# Patient Record
Sex: MALE | Race: WHITE | NOT HISPANIC OR LATINO | URBAN - METROPOLITAN AREA
[De-identification: names, ages, dates, MRNs, and addresses within clinical notes are randomized per-mention and may not be internally consistent; named-entity substitution may affect disease eponyms.]

---

## 2021-10-27 ENCOUNTER — NEW PATIENT ROUTINE/VISION (OUTPATIENT)
Dept: URBAN - METROPOLITAN AREA CLINIC 52 | Facility: CLINIC | Age: 71
End: 2021-10-27

## 2021-10-27 DIAGNOSIS — H52.4: ICD-10-CM

## 2021-10-27 DIAGNOSIS — Z01.01: ICD-10-CM

## 2021-10-27 DIAGNOSIS — H33.011: ICD-10-CM

## 2021-10-27 PROCEDURE — 92015 DETERMINE REFRACTIVE STATE: CPT

## 2021-10-27 PROCEDURE — 92134 CPTRZ OPH DX IMG PST SGM RTA: CPT

## 2021-10-27 PROCEDURE — 92004 COMPRE OPH EXAM NEW PT 1/>: CPT

## 2021-10-27 ASSESSMENT — KERATOMETRY
OD_AXISANGLE2_DEGREES: 094
OD_K1POWER_DIOPTERS: 41.75
OD_K2POWER_DIOPTERS: 42.50
OS_AXISANGLE2_DEGREES: 57
OS_AXISANGLE_DEGREES: 147
OS_K2POWER_DIOPTERS: 42.25
OS_K1POWER_DIOPTERS: 42.00
OD_AXISANGLE_DEGREES: 4

## 2021-10-27 ASSESSMENT — VISUAL ACUITY
OD_GLARE: 20/30
OD_SC: 20/40+2
OS_SC: 20/25+2
OS_GLARE: 20/25
OD_PH: 20/25-2
OS_GLARE: 20/25
OD_GLARE: 20/40
OU_SC: J4

## 2021-10-27 ASSESSMENT — TONOMETRY
OS_IOP_MMHG: 16
OD_IOP_MMHG: 15

## 2021-10-27 NOTE — PATIENT DISCUSSION
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 Round break superior in periphery. Patient to go straight to [facility] for STAT referral. Will be seeing Dr. Felisha Olson. Will release care to [facility]. Will continue to see patient for annual examination.

## 2022-06-13 ENCOUNTER — COMPREHENSIVE EXAM (OUTPATIENT)
Dept: URBAN - METROPOLITAN AREA CLINIC 50 | Facility: CLINIC | Age: 72
End: 2022-06-13

## 2022-06-13 DIAGNOSIS — H21.541: ICD-10-CM

## 2022-06-13 DIAGNOSIS — H25.811: ICD-10-CM

## 2022-06-13 PROCEDURE — 92014 COMPRE OPH EXAM EST PT 1/>: CPT

## 2022-06-13 PROCEDURE — 92134 CPTRZ OPH DX IMG PST SGM RTA: CPT

## 2022-06-13 ASSESSMENT — VISUAL ACUITY
OD_SC: CF 2FT
OS_SC: 20/20

## 2022-06-13 ASSESSMENT — KERATOMETRY
OD_K1POWER_DIOPTERS: 41.75
OS_AXISANGLE_DEGREES: 147
OS_K2POWER_DIOPTERS: 42.25
OD_AXISANGLE_DEGREES: 4
OD_AXISANGLE2_DEGREES: 094
OD_K2POWER_DIOPTERS: 42.50
OS_AXISANGLE2_DEGREES: 57
OS_K1POWER_DIOPTERS: 42.00

## 2022-06-13 ASSESSMENT — TONOMETRY
OS_IOP_MMHG: 12
OD_IOP_MMHG: 13

## 2022-06-13 NOTE — PATIENT DISCUSSION
Will need Lysis of Adhesions at time of cataract surgery. Try Pan Blue and extra Duet will be needed.

## 2022-06-13 NOTE — PATIENT DISCUSSION
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 Round break superior in periphery. Patient to go straight to [facility] for STAT referral. Will be seeing Dr. Josiane Hercules. [facility] Will continue to see patient for annual examination.

## 2022-06-23 ENCOUNTER — DIAGNOSTICS ONLY (OUTPATIENT)
Dept: URBAN - METROPOLITAN AREA CLINIC 50 | Facility: CLINIC | Age: 72
End: 2022-06-23

## 2022-06-23 DIAGNOSIS — H25.811: ICD-10-CM

## 2022-06-23 PROCEDURE — 92136 OPHTHALMIC BIOMETRY: CPT

## 2022-06-23 PROCEDURE — 92025IOL CORNEAL TOPOGRAPHY PREMIUM IOL

## 2022-06-23 ASSESSMENT — KERATOMETRY
OD_K2POWER_DIOPTERS: 40.00
OD_AXISANGLE_DEGREES: 106
OD_K1POWER_DIOPTERS: 41.25
OS_K1POWER_DIOPTERS: 42.25
OS_K2POWER_DIOPTERS: 41.62
OS_AXISANGLE2_DEGREES: 130
OS_AXISANGLE_DEGREES: 40
OD_AXISANGLE2_DEGREES: 16

## 2022-06-23 NOTE — PATIENT DISCUSSION
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 Round break superior in periphery. Patient to go straight to [facility] for STAT referral. Will be seeing Dr. Katelynn Odonnell. [facility] Will continue to see patient for annual examination.

## 2022-07-18 ENCOUNTER — PRE-OP/H&P (OUTPATIENT)
Dept: URBAN - METROPOLITAN AREA CLINIC 50 | Facility: CLINIC | Age: 72
End: 2022-07-18

## 2022-07-18 DIAGNOSIS — H25.811: ICD-10-CM

## 2022-07-18 DIAGNOSIS — H43.812: ICD-10-CM

## 2022-07-18 PROCEDURE — 92134 CPTRZ OPH DX IMG PST SGM RTA: CPT

## 2022-07-18 PROCEDURE — PREOP PRE OP VISIT

## 2022-07-18 ASSESSMENT — TONOMETRY
OD_IOP_MMHG: 14
OS_IOP_MMHG: 15

## 2022-07-18 ASSESSMENT — KERATOMETRY
OD_K1POWER_DIOPTERS: 41.25
OD_K2POWER_DIOPTERS: 40.00
OS_K1POWER_DIOPTERS: 42.25
OS_AXISANGLE2_DEGREES: 130
OD_AXISANGLE_DEGREES: 106
OS_AXISANGLE_DEGREES: 40
OD_AXISANGLE2_DEGREES: 16
OS_K2POWER_DIOPTERS: 41.62

## 2022-07-18 ASSESSMENT — VISUAL ACUITY
OS_SC: 20/40
OS_PH: 20/30
OD_SC: CF 1FT

## 2022-07-18 NOTE — PATIENT DISCUSSION
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 Round break superior in periphery. Patient to go straight to [facility] for STAT referral. Will be seeing Dr. Jacqueline Ward. [facility] Will continue to see patient for annual examination.

## 2022-07-27 ASSESSMENT — KERATOMETRY
OS_K1POWER_DIOPTERS: 42.25
OS_AXISANGLE_DEGREES: 40
OD_AXISANGLE_DEGREES: 106
OD_AXISANGLE2_DEGREES: 16
OD_K1POWER_DIOPTERS: 41.25
OD_K2POWER_DIOPTERS: 40.00
OS_K2POWER_DIOPTERS: 41.62
OS_AXISANGLE2_DEGREES: 130

## 2022-07-28 ENCOUNTER — POST-OP (OUTPATIENT)
Dept: URBAN - METROPOLITAN AREA CLINIC 48 | Facility: CLINIC | Age: 72
End: 2022-07-28

## 2022-07-28 ENCOUNTER — SURGERY/PROCEDURE (OUTPATIENT)
Dept: URBAN - METROPOLITAN AREA SURGERY 16 | Facility: SURGERY | Age: 72
End: 2022-07-28

## 2022-07-28 DIAGNOSIS — H21.541: ICD-10-CM

## 2022-07-28 DIAGNOSIS — H25.811: ICD-10-CM

## 2022-07-28 DIAGNOSIS — H57.03: ICD-10-CM

## 2022-07-28 DIAGNOSIS — Z96.1: ICD-10-CM

## 2022-07-28 DIAGNOSIS — Z98.41: ICD-10-CM

## 2022-07-28 PROCEDURE — 66982 XCAPSL CTRC RMVL CPLX WO ECP: CPT

## 2022-07-28 ASSESSMENT — KERATOMETRY
OD_AXISANGLE_DEGREES: 106
OS_AXISANGLE2_DEGREES: 130
OD_K1POWER_DIOPTERS: 41.25
OS_K2POWER_DIOPTERS: 41.62
OD_AXISANGLE2_DEGREES: 16
OD_K2POWER_DIOPTERS: 40.00
OS_AXISANGLE_DEGREES: 40
OS_K1POWER_DIOPTERS: 42.25

## 2022-07-28 ASSESSMENT — VISUAL ACUITY: OD_SC: CF 2FT

## 2022-07-28 ASSESSMENT — TONOMETRY: OD_IOP_MMHG: 10

## 2022-07-28 NOTE — PATIENT DISCUSSION
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 Round break superior in periphery. Patient to go straight to [facility] for STAT referral. Will be seeing Dr. Felisha Olson. Will release care to [facility]. Will continue to see patient for annual examination.

## 2022-07-28 NOTE — PATIENT DISCUSSION
Will hold on final Rx until patient cleared from [facility].

## 2022-07-28 NOTE — PATIENT DISCUSSION
Good ocular health on dilated exam today despite abnormal findings. Eyeglass prescription given. Patient instructed to call office immediately if sudden changes in vision occur. Emphasized importance of sunglasses and healthy lifestyle. LM to call office to discuss Lomotil

## 2022-07-28 NOTE — PATIENT DISCUSSION
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 Round break superior in periphery. Patient to go straight to [facility] for STAT referral. Will be seeing Dr. Tram Henson. Will release care to [facility]. Will continue to see patient for annual examination.

## 2022-08-01 ENCOUNTER — POST-OP (OUTPATIENT)
Dept: URBAN - METROPOLITAN AREA CLINIC 50 | Facility: CLINIC | Age: 72
End: 2022-08-01

## 2022-08-01 DIAGNOSIS — Z98.41: ICD-10-CM

## 2022-08-01 PROCEDURE — 99024 POSTOP FOLLOW-UP VISIT: CPT

## 2022-08-01 RX ORDER — DUREZOL 0.5 MG/ML
1 EMULSION OPHTHALMIC TWICE A DAY
Start: 2022-08-01

## 2022-08-01 RX ORDER — SODIUM CHLORIDE 50 MG/G
OINTMENT OPHTHALMIC EVERY EVENING
Start: 2022-08-01

## 2022-08-01 ASSESSMENT — KERATOMETRY
OS_K2POWER_DIOPTERS: 41.62
OS_AXISANGLE2_DEGREES: 130
OD_K1POWER_DIOPTERS: 41.25
OS_AXISANGLE_DEGREES: 40
OD_AXISANGLE_DEGREES: 106
OS_K1POWER_DIOPTERS: 42.25
OD_AXISANGLE2_DEGREES: 16
OD_K2POWER_DIOPTERS: 40.00

## 2022-08-01 ASSESSMENT — VISUAL ACUITY: OD_SC: CF 3FT

## 2022-08-01 ASSESSMENT — TONOMETRY: OD_IOP_MMHG: 14

## 2022-08-01 NOTE — PATIENT DISCUSSION
Mature Cataract: During surgery, utilizing trypan blue, the anterior capsule immediately had a bi-directional rent ([place]). The Posterior capsule had leathery, dense capsular plaque. Able to remove the cataract and place the IOL without incident. Advised patient in today post op appointment that he will require a posterior capsulotomy (Possible surgical intervention) down the road.

## 2022-08-01 NOTE — PATIENT DISCUSSION
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 Will follow up as scheduled.

## 2022-08-01 NOTE — PATIENT DISCUSSION
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 Round break superior in periphery. Patient to go straight to [facility] for STAT referral. Will be seeing Dr. Kinjal Arriaga. Will release care to [facility]. Will continue to see patient for annual examination.

## 2022-08-29 ENCOUNTER — POST-OP (OUTPATIENT)
Dept: URBAN - METROPOLITAN AREA CLINIC 50 | Facility: CLINIC | Age: 72
End: 2022-08-29

## 2022-08-29 DIAGNOSIS — H26.491: ICD-10-CM

## 2022-08-29 DIAGNOSIS — Z96.1: ICD-10-CM

## 2022-08-29 DIAGNOSIS — H33.41: ICD-10-CM

## 2022-08-29 DIAGNOSIS — Z98.41: ICD-10-CM

## 2022-08-29 PROCEDURE — 99024 POSTOP FOLLOW-UP VISIT: CPT

## 2022-08-29 PROCEDURE — 66821 AFTER CATARACT LASER SURGERY: CPT

## 2022-08-29 PROCEDURE — 92134 CPTRZ OPH DX IMG PST SGM RTA: CPT

## 2022-08-29 RX ORDER — PREDNISOLONE ACETATE 10 MG/ML
1 SUSPENSION/ DROPS OPHTHALMIC
Start: 2022-08-29

## 2022-08-29 ASSESSMENT — KERATOMETRY
OD_AXISANGLE_DEGREES: 001
OD_K2POWER_DIOPTERS: 42.25
OD_AXISANGLE2_DEGREES: 91
OS_AXISANGLE_DEGREES: 137
OS_K2POWER_DIOPTERS: 42.25
OD_K1POWER_DIOPTERS: 41.00
OS_AXISANGLE2_DEGREES: 047
OS_K1POWER_DIOPTERS: 42.00

## 2022-08-29 ASSESSMENT — TONOMETRY
OS_IOP_MMHG: 13
OD_IOP_MMHG: 14

## 2022-08-29 ASSESSMENT — VISUAL ACUITY
OS_SC: J2@14"
OD_SC: CF 3FT
OU_SC: 20/30-1
OD_SC: 20/400@14"
OS_SC: 20/30-2
OU_SC: J2@14"
OS_PH: 20/25

## 2022-08-29 NOTE — PATIENT DISCUSSION
YAG Laser OD performed today with patient's signed consent. Will start patient on Durezol BID until seen by [facility] or [facility].

## 2022-08-29 NOTE — PROCEDURE NOTE: CLINICAL
PROCEDURE NOTE: YAG Capsulotomy OD. Diagnosis: Posterior Capsular Opacification (PCO). Prior to treatment, the risks/benefits/alternatives were discussed. The patient wished to proceed with procedure. Consent was signed. Proparacaine and brominidine were placed into the operative eye after the eye was dilated. Power = 2.5mJ. Number of pulses = 62. Patient tolerated procedure well and there were no complications. Post Laser instructions given. Rey Souza

## 2022-08-29 NOTE — PATIENT DISCUSSION
PCO ([id number]): Visually significant PCO present on exam today. Recommend YAG laser capsulotomy to improve vision and decrease glare symptoms. RBAs of procedure discussed. Patient agrees and wishes to proceed.

## 2022-08-29 NOTE — PATIENT DISCUSSION
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 Round break superior in periphery. Patient to go straight to [facility] for STAT referral. Will be seeing Dr. Cari Murry. [facility] Will continue to see patient for annual examination.

## 2022-09-19 ENCOUNTER — POST-OP (OUTPATIENT)
Dept: URBAN - METROPOLITAN AREA CLINIC 50 | Facility: CLINIC | Age: 72
End: 2022-09-19

## 2022-09-19 DIAGNOSIS — Z96.1: ICD-10-CM

## 2022-09-19 DIAGNOSIS — H18.231: ICD-10-CM

## 2022-09-19 DIAGNOSIS — H43.812: ICD-10-CM

## 2022-09-19 DIAGNOSIS — Z98.890: ICD-10-CM

## 2022-09-19 PROCEDURE — 99024 POSTOP FOLLOW-UP VISIT: CPT

## 2022-09-19 PROCEDURE — 92134 CPTRZ OPH DX IMG PST SGM RTA: CPT

## 2022-09-19 ASSESSMENT — TONOMETRY
OS_IOP_MMHG: 14
OD_IOP_MMHG: 13

## 2022-09-19 ASSESSMENT — KERATOMETRY
OD_K2POWER_DIOPTERS: 42.25
OD_AXISANGLE2_DEGREES: 91
OD_K1POWER_DIOPTERS: 41.00
OS_AXISANGLE2_DEGREES: 047
OS_K1POWER_DIOPTERS: 42.00
OD_AXISANGLE_DEGREES: 001
OS_AXISANGLE_DEGREES: 137
OS_K2POWER_DIOPTERS: 42.25

## 2022-09-19 ASSESSMENT — VISUAL ACUITY
OD_SC: 20/400
OS_SC: 20/20-2

## 2022-09-19 NOTE — PATIENT DISCUSSION
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 Round break superior in periphery. Patient to go straight to [facility] for STAT referral. Will be seeing Dr. Christine Santana. [facility] Will continue to see patient for annual examination.

## 2022-09-19 NOTE — PATIENT DISCUSSION
Will continue patient on Johann 128 5% Oint QHS OD and Durezol BID until instructed to discontinue. Will follow up in 5-6 weeks for DFE OD and Repeat MRX OU with MAC OCT.

## 2022-09-19 NOTE — PATIENT DISCUSSION
PCO ([id number]): Visually significant PCO present on exam today. Recommend YAG laser capsulotomy to improve vision and decrease glare symptoms. RBAs of procedure discussed. Patient agrees and wishes to proceed.

## 2022-10-17 ENCOUNTER — ESTABLISHED PATIENT (OUTPATIENT)
Dept: URBAN - METROPOLITAN AREA CLINIC 50 | Facility: CLINIC | Age: 72
End: 2022-10-17

## 2022-10-17 DIAGNOSIS — H33.41: ICD-10-CM

## 2022-10-17 DIAGNOSIS — H35.033: ICD-10-CM

## 2022-10-17 DIAGNOSIS — H18.231: ICD-10-CM

## 2022-10-17 DIAGNOSIS — H43.812: ICD-10-CM

## 2022-10-17 DIAGNOSIS — Z96.1: ICD-10-CM

## 2022-10-17 DIAGNOSIS — H25.12: ICD-10-CM

## 2022-10-17 PROCEDURE — 92134 CPTRZ OPH DX IMG PST SGM RTA: CPT

## 2022-10-17 PROCEDURE — 99024 POSTOP FOLLOW-UP VISIT: CPT

## 2022-10-17 PROCEDURE — 92015 DETERMINE REFRACTIVE STATE: CPT

## 2022-10-17 RX ORDER — DIFLUPREDNATE 0.5 MG/ML: 1 EMULSION OPHTHALMIC ONCE A DAY

## 2022-10-17 ASSESSMENT — TONOMETRY
OS_IOP_MMHG: 14
OD_IOP_MMHG: 12

## 2022-10-17 ASSESSMENT — VISUAL ACUITY
OD_SC: 20/200
OS_SC: 20/25+1

## 2022-10-17 NOTE — PATIENT DISCUSSION
PCO ([id number]): Visually significant PCO present on exam today. Recommend YAG laser capsulotomy to improve vision and decrease glare symptoms. RBAs of procedure discussed. Patient agrees and wishes to proceed.

## 2022-10-17 NOTE — PATIENT DISCUSSION
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 Round break superior in periphery. Patient to go straight to [facility] for STAT referral. Will be seeing Dr. Nat Bagely. [facility] Will continue to see patient for annual examination.

## 2022-10-17 NOTE — PATIENT DISCUSSION
Patient okay to d/c Johann 128 Oint. Will begin to taper Durezol BID x2 weeks, then Once a day x2 weeks, then stop.

## 2022-12-30 NOTE — PATIENT DISCUSSION
Start Doxy 100mg BID PO. Ok to wait a couple days if want to see if any improvement with just compresses.

## 2025-04-23 ENCOUNTER — COMPREHENSIVE EXAM (OUTPATIENT)
Age: 75
End: 2025-04-23

## 2025-04-23 DIAGNOSIS — H25.812: ICD-10-CM

## 2025-04-23 DIAGNOSIS — H52.4: ICD-10-CM

## 2025-04-23 DIAGNOSIS — H33.41: ICD-10-CM

## 2025-04-23 PROCEDURE — 92015 DETERMINE REFRACTIVE STATE: CPT

## 2025-04-23 PROCEDURE — 99214 OFFICE O/P EST MOD 30 MIN: CPT
